# Patient Record
Sex: FEMALE | Race: WHITE | URBAN - METROPOLITAN AREA
[De-identification: names, ages, dates, MRNs, and addresses within clinical notes are randomized per-mention and may not be internally consistent; named-entity substitution may affect disease eponyms.]

---

## 2017-10-25 ENCOUNTER — OUTPATIENT (OUTPATIENT)
Dept: OUTPATIENT SERVICES | Facility: HOSPITAL | Age: 67
LOS: 1 days | Discharge: HOME | End: 2017-10-25

## 2017-10-25 DIAGNOSIS — Z12.31 ENCOUNTER FOR SCREENING MAMMOGRAM FOR MALIGNANT NEOPLASM OF BREAST: ICD-10-CM

## 2018-10-26 ENCOUNTER — OUTPATIENT (OUTPATIENT)
Dept: OUTPATIENT SERVICES | Facility: HOSPITAL | Age: 68
LOS: 1 days | Discharge: HOME | End: 2018-10-26

## 2018-10-26 DIAGNOSIS — Z12.31 ENCOUNTER FOR SCREENING MAMMOGRAM FOR MALIGNANT NEOPLASM OF BREAST: ICD-10-CM

## 2019-11-19 ENCOUNTER — OUTPATIENT (OUTPATIENT)
Dept: OUTPATIENT SERVICES | Facility: HOSPITAL | Age: 69
LOS: 1 days | Discharge: HOME | End: 2019-11-19
Payer: MEDICARE

## 2019-11-19 DIAGNOSIS — Z12.31 ENCOUNTER FOR SCREENING MAMMOGRAM FOR MALIGNANT NEOPLASM OF BREAST: ICD-10-CM

## 2019-11-19 PROCEDURE — 77067 SCR MAMMO BI INCL CAD: CPT | Mod: 26

## 2019-11-19 PROCEDURE — 77063 BREAST TOMOSYNTHESIS BI: CPT | Mod: 26

## 2020-11-20 ENCOUNTER — OUTPATIENT (OUTPATIENT)
Dept: OUTPATIENT SERVICES | Facility: HOSPITAL | Age: 70
LOS: 1 days | Discharge: HOME | End: 2020-11-20
Payer: MEDICARE

## 2020-11-20 DIAGNOSIS — Z12.31 ENCOUNTER FOR SCREENING MAMMOGRAM FOR MALIGNANT NEOPLASM OF BREAST: ICD-10-CM

## 2020-11-20 PROCEDURE — 77063 BREAST TOMOSYNTHESIS BI: CPT | Mod: 26

## 2020-11-20 PROCEDURE — 77067 SCR MAMMO BI INCL CAD: CPT | Mod: 26

## 2021-03-13 ENCOUNTER — INPATIENT (INPATIENT)
Facility: HOSPITAL | Age: 71
LOS: 0 days | Discharge: HOME | End: 2021-03-14
Attending: SURGERY | Admitting: SURGERY
Payer: MEDICARE

## 2021-03-13 ENCOUNTER — EMERGENCY (EMERGENCY)
Facility: HOSPITAL | Age: 71
LOS: 0 days | Discharge: HOME | End: 2021-03-13
Admitting: SURGERY

## 2021-03-13 VITALS
HEIGHT: 64 IN | RESPIRATION RATE: 18 BRPM | DIASTOLIC BLOOD PRESSURE: 86 MMHG | TEMPERATURE: 98 F | OXYGEN SATURATION: 97 % | SYSTOLIC BLOOD PRESSURE: 167 MMHG | WEIGHT: 244.93 LBS | HEART RATE: 91 BPM
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
## 2021-03-13 DIAGNOSIS — S06.5X9A TRAUMATIC SUBDURAL HEMORRHAGE WITH LOSS OF CONSCIOUSNESS OF UNSPECIFIED DURATION, INITIAL ENCOUNTER: ICD-10-CM
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
## 2021-03-13 DIAGNOSIS — S06.5X0A TRAUMATIC SUBDURAL HEMORRHAGE WITHOUT LOSS OF CONSCIOUSNESS, INITIAL ENCOUNTER: ICD-10-CM

## 2021-03-13 DIAGNOSIS — S60.519A ABRASION OF UNSPECIFIED HAND, INITIAL ENCOUNTER: ICD-10-CM
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
## 2021-03-13 DIAGNOSIS — T14.90XA INJURY, UNSPECIFIED, INITIAL ENCOUNTER: ICD-10-CM

## 2021-03-13 DIAGNOSIS — E78.00 PURE HYPERCHOLESTEROLEMIA, UNSPECIFIED: ICD-10-CM

## 2021-03-13 DIAGNOSIS — E66.01 MORBID (SEVERE) OBESITY DUE TO EXCESS CALORIES: ICD-10-CM
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
## 2021-03-13 DIAGNOSIS — S13.9XXA SPRAIN OF JOINTS AND LIGAMENTS OF UNSPECIFIED PARTS OF NECK, INITIAL ENCOUNTER: ICD-10-CM

## 2021-03-13 DIAGNOSIS — S60.511A ABRASION OF RIGHT HAND, INITIAL ENCOUNTER: ICD-10-CM

## 2021-03-13 DIAGNOSIS — M19.041 PRIMARY OSTEOARTHRITIS, RIGHT HAND: ICD-10-CM

## 2021-03-13 DIAGNOSIS — I10 ESSENTIAL (PRIMARY) HYPERTENSION: ICD-10-CM

## 2021-03-13 DIAGNOSIS — Z96.651 PRESENCE OF RIGHT ARTIFICIAL KNEE JOINT: ICD-10-CM

## 2021-03-13 DIAGNOSIS — Z71.3 DIETARY COUNSELING AND SURVEILLANCE: ICD-10-CM

## 2021-03-13 LAB
ALBUMIN SERPL ELPH-MCNC: 4.9 G/DL — SIGNIFICANT CHANGE UP (ref 3.5–5.2)
ALP SERPL-CCNC: 78 U/L — SIGNIFICANT CHANGE UP (ref 30–115)
ALT FLD-CCNC: 22 U/L — SIGNIFICANT CHANGE UP (ref 0–41)
ANION GAP SERPL CALC-SCNC: 10 MMOL/L — SIGNIFICANT CHANGE UP (ref 7–14)
ANION GAP SERPL CALC-SCNC: 11 MMOL/L — SIGNIFICANT CHANGE UP (ref 7–14)
APTT BLD: 31.9 SEC — SIGNIFICANT CHANGE UP (ref 27–39.2)
AST SERPL-CCNC: 32 U/L — SIGNIFICANT CHANGE UP (ref 0–41)
BASOPHILS # BLD AUTO: 0.06 K/UL — SIGNIFICANT CHANGE UP (ref 0–0.2)
BASOPHILS # BLD AUTO: 0.07 K/UL — SIGNIFICANT CHANGE UP (ref 0–0.2)
BASOPHILS NFR BLD AUTO: 0.6 % — SIGNIFICANT CHANGE UP (ref 0–1)
BASOPHILS NFR BLD AUTO: 0.7 % — SIGNIFICANT CHANGE UP (ref 0–1)
BILIRUB SERPL-MCNC: 0.5 MG/DL — SIGNIFICANT CHANGE UP (ref 0.2–1.2)
BLD GP AB SCN SERPL QL: SIGNIFICANT CHANGE UP
BUN SERPL-MCNC: 16 MG/DL — SIGNIFICANT CHANGE UP (ref 10–20)
BUN SERPL-MCNC: 19 MG/DL — SIGNIFICANT CHANGE UP (ref 10–20)
CALCIUM SERPL-MCNC: 10.2 MG/DL — HIGH (ref 8.5–10.1)
CALCIUM SERPL-MCNC: 10.7 MG/DL — HIGH (ref 8.5–10.1)
CHLORIDE SERPL-SCNC: 100 MMOL/L — SIGNIFICANT CHANGE UP (ref 98–110)
CHLORIDE SERPL-SCNC: 102 MMOL/L — SIGNIFICANT CHANGE UP (ref 98–110)
CO2 SERPL-SCNC: 26 MMOL/L — SIGNIFICANT CHANGE UP (ref 17–32)
CO2 SERPL-SCNC: 27 MMOL/L — SIGNIFICANT CHANGE UP (ref 17–32)
CREAT SERPL-MCNC: 0.6 MG/DL — LOW (ref 0.7–1.5)
CREAT SERPL-MCNC: 0.7 MG/DL — SIGNIFICANT CHANGE UP (ref 0.7–1.5)
EOSINOPHIL # BLD AUTO: 0.24 K/UL — SIGNIFICANT CHANGE UP (ref 0–0.7)
EOSINOPHIL # BLD AUTO: 0.26 K/UL — SIGNIFICANT CHANGE UP (ref 0–0.7)
EOSINOPHIL NFR BLD AUTO: 2.5 % — SIGNIFICANT CHANGE UP (ref 0–8)
EOSINOPHIL NFR BLD AUTO: 2.6 % — SIGNIFICANT CHANGE UP (ref 0–8)
GLUCOSE SERPL-MCNC: 101 MG/DL — HIGH (ref 70–99)
GLUCOSE SERPL-MCNC: 113 MG/DL — HIGH (ref 70–99)
HCT VFR BLD CALC: 39.2 % — SIGNIFICANT CHANGE UP (ref 37–47)
HCT VFR BLD CALC: 44 % — SIGNIFICANT CHANGE UP (ref 37–47)
HGB BLD-MCNC: 12.5 G/DL — SIGNIFICANT CHANGE UP (ref 12–16)
HGB BLD-MCNC: 14.1 G/DL — SIGNIFICANT CHANGE UP (ref 12–16)
IMM GRANULOCYTES NFR BLD AUTO: 0.4 % — HIGH (ref 0.1–0.3)
IMM GRANULOCYTES NFR BLD AUTO: 0.4 % — HIGH (ref 0.1–0.3)
INR BLD: 0.98 RATIO — SIGNIFICANT CHANGE UP (ref 0.65–1.3)
LYMPHOCYTES # BLD AUTO: 1.12 K/UL — LOW (ref 1.2–3.4)
LYMPHOCYTES # BLD AUTO: 11.7 % — LOW (ref 20.5–51.1)
LYMPHOCYTES # BLD AUTO: 19.7 % — LOW (ref 20.5–51.1)
LYMPHOCYTES # BLD AUTO: 2.01 K/UL — SIGNIFICANT CHANGE UP (ref 1.2–3.4)
MAGNESIUM SERPL-MCNC: 1.9 MG/DL — SIGNIFICANT CHANGE UP (ref 1.8–2.4)
MCHC RBC-ENTMCNC: 30 PG — SIGNIFICANT CHANGE UP (ref 27–31)
MCHC RBC-ENTMCNC: 30.3 PG — SIGNIFICANT CHANGE UP (ref 27–31)
MCHC RBC-ENTMCNC: 31.9 G/DL — LOW (ref 32–37)
MCHC RBC-ENTMCNC: 32 G/DL — SIGNIFICANT CHANGE UP (ref 32–37)
MCV RBC AUTO: 94.2 FL — SIGNIFICANT CHANGE UP (ref 81–99)
MCV RBC AUTO: 94.4 FL — SIGNIFICANT CHANGE UP (ref 81–99)
MONOCYTES # BLD AUTO: 0.73 K/UL — HIGH (ref 0.1–0.6)
MONOCYTES # BLD AUTO: 0.99 K/UL — HIGH (ref 0.1–0.6)
MONOCYTES NFR BLD AUTO: 7.6 % — SIGNIFICANT CHANGE UP (ref 1.7–9.3)
MONOCYTES NFR BLD AUTO: 9.7 % — HIGH (ref 1.7–9.3)
NEUTROPHILS # BLD AUTO: 6.82 K/UL — HIGH (ref 1.4–6.5)
NEUTROPHILS # BLD AUTO: 7.37 K/UL — HIGH (ref 1.4–6.5)
NEUTROPHILS NFR BLD AUTO: 67 % — SIGNIFICANT CHANGE UP (ref 42.2–75.2)
NEUTROPHILS NFR BLD AUTO: 77.1 % — HIGH (ref 42.2–75.2)
NRBC # BLD: 0 /100 WBCS — SIGNIFICANT CHANGE UP (ref 0–0)
NRBC # BLD: 0 /100 WBCS — SIGNIFICANT CHANGE UP (ref 0–0)
PHOSPHATE SERPL-MCNC: 3.1 MG/DL — SIGNIFICANT CHANGE UP (ref 2.1–4.9)
PLATELET # BLD AUTO: 246 K/UL — SIGNIFICANT CHANGE UP (ref 130–400)
PLATELET # BLD AUTO: 295 K/UL — SIGNIFICANT CHANGE UP (ref 130–400)
POTASSIUM SERPL-MCNC: 4 MMOL/L — SIGNIFICANT CHANGE UP (ref 3.5–5)
POTASSIUM SERPL-MCNC: 4 MMOL/L — SIGNIFICANT CHANGE UP (ref 3.5–5)
POTASSIUM SERPL-SCNC: 4 MMOL/L — SIGNIFICANT CHANGE UP (ref 3.5–5)
POTASSIUM SERPL-SCNC: 4 MMOL/L — SIGNIFICANT CHANGE UP (ref 3.5–5)
PROT SERPL-MCNC: 7.6 G/DL — SIGNIFICANT CHANGE UP (ref 6–8)
PROTHROM AB SERPL-ACNC: 11.3 SEC — SIGNIFICANT CHANGE UP (ref 9.95–12.87)
RAPID RVP RESULT: SIGNIFICANT CHANGE UP
RBC # BLD: 4.16 M/UL — LOW (ref 4.2–5.4)
RBC # BLD: 4.66 M/UL — SIGNIFICANT CHANGE UP (ref 4.2–5.4)
RBC # FLD: 13.2 % — SIGNIFICANT CHANGE UP (ref 11.5–14.5)
RBC # FLD: 13.3 % — SIGNIFICANT CHANGE UP (ref 11.5–14.5)
SARS-COV-2 RNA SPEC QL NAA+PROBE: SIGNIFICANT CHANGE UP
SODIUM SERPL-SCNC: 137 MMOL/L — SIGNIFICANT CHANGE UP (ref 135–146)
SODIUM SERPL-SCNC: 139 MMOL/L — SIGNIFICANT CHANGE UP (ref 135–146)
WBC # BLD: 10.18 K/UL — SIGNIFICANT CHANGE UP (ref 4.8–10.8)
WBC # BLD: 9.57 K/UL — SIGNIFICANT CHANGE UP (ref 4.8–10.8)
WBC # FLD AUTO: 10.18 K/UL — SIGNIFICANT CHANGE UP (ref 4.8–10.8)
WBC # FLD AUTO: 9.57 K/UL — SIGNIFICANT CHANGE UP (ref 4.8–10.8)

## 2021-03-13 PROCEDURE — 70450 CT HEAD/BRAIN W/O DYE: CPT | Mod: 26

## 2021-03-13 PROCEDURE — 73120 X-RAY EXAM OF HAND: CPT | Mod: 26,RT

## 2021-03-13 PROCEDURE — 99284 EMERGENCY DEPT VISIT MOD MDM: CPT

## 2021-03-13 PROCEDURE — 99291 CRITICAL CARE FIRST HOUR: CPT

## 2021-03-13 RX ORDER — CHLORHEXIDINE GLUCONATE 213 G/1000ML
1 SOLUTION TOPICAL DAILY
Refills: 0 | Status: DISCONTINUED | OUTPATIENT
Start: 2021-03-13 | End: 2021-03-14

## 2021-03-13 RX ORDER — HEPARIN SODIUM 5000 [USP'U]/ML
5000 INJECTION INTRAVENOUS; SUBCUTANEOUS EVERY 8 HOURS
Refills: 0 | Status: DISCONTINUED | OUTPATIENT
Start: 2021-03-13 | End: 2021-03-14

## 2021-03-13 RX ORDER — LEVETIRACETAM 250 MG/1
1000 TABLET, FILM COATED ORAL ONCE
Refills: 0 | Status: COMPLETED | OUTPATIENT
Start: 2021-03-13 | End: 2021-03-13

## 2021-03-13 RX ORDER — ACETAMINOPHEN 500 MG
650 TABLET ORAL EVERY 6 HOURS
Refills: 0 | Status: DISCONTINUED | OUTPATIENT
Start: 2021-03-13 | End: 2021-03-14

## 2021-03-13 RX ORDER — PANTOPRAZOLE SODIUM 20 MG/1
40 TABLET, DELAYED RELEASE ORAL
Refills: 0 | Status: DISCONTINUED | OUTPATIENT
Start: 2021-03-13 | End: 2021-03-14

## 2021-03-13 RX ORDER — LOSARTAN POTASSIUM 100 MG/1
10 TABLET, FILM COATED ORAL DAILY
Refills: 0 | Status: DISCONTINUED | OUTPATIENT
Start: 2021-03-13 | End: 2021-03-14

## 2021-03-13 RX ORDER — METOPROLOL TARTRATE 50 MG
75 TABLET ORAL DAILY
Refills: 0 | Status: DISCONTINUED | OUTPATIENT
Start: 2021-03-13 | End: 2021-03-14

## 2021-03-13 RX ORDER — SODIUM CHLORIDE 9 MG/ML
1000 INJECTION, SOLUTION INTRAVENOUS
Refills: 0 | Status: DISCONTINUED | OUTPATIENT
Start: 2021-03-13 | End: 2021-03-14

## 2021-03-13 RX ORDER — TETANUS TOXOID, REDUCED DIPHTHERIA TOXOID AND ACELLULAR PERTUSSIS VACCINE, ADSORBED 5; 2.5; 8; 8; 2.5 [IU]/.5ML; [IU]/.5ML; UG/.5ML; UG/.5ML; UG/.5ML
0.5 SUSPENSION INTRAMUSCULAR ONCE
Refills: 0 | Status: COMPLETED | OUTPATIENT
Start: 2021-03-13 | End: 2021-03-13

## 2021-03-13 RX ORDER — ATORVASTATIN CALCIUM 80 MG/1
10 TABLET, FILM COATED ORAL AT BEDTIME
Refills: 0 | Status: DISCONTINUED | OUTPATIENT
Start: 2021-03-13 | End: 2021-03-14

## 2021-03-13 RX ORDER — HYDROCHLOROTHIAZIDE 25 MG
12.5 TABLET ORAL DAILY
Refills: 0 | Status: DISCONTINUED | OUTPATIENT
Start: 2021-03-13 | End: 2021-03-14

## 2021-03-13 RX ORDER — LEVETIRACETAM 250 MG/1
500 TABLET, FILM COATED ORAL
Refills: 0 | Status: DISCONTINUED | OUTPATIENT
Start: 2021-03-13 | End: 2021-03-14

## 2021-03-13 RX ADMIN — Medication 650 MILLIGRAM(S): at 23:50

## 2021-03-13 RX ADMIN — LEVETIRACETAM 400 MILLIGRAM(S): 250 TABLET, FILM COATED ORAL at 12:19

## 2021-03-13 RX ADMIN — TETANUS TOXOID, REDUCED DIPHTHERIA TOXOID AND ACELLULAR PERTUSSIS VACCINE, ADSORBED 0.5 MILLILITER(S): 5; 2.5; 8; 8; 2.5 SUSPENSION INTRAMUSCULAR at 11:07

## 2021-03-13 RX ADMIN — LEVETIRACETAM 500 MILLIGRAM(S): 250 TABLET, FILM COATED ORAL at 19:18

## 2021-03-13 NOTE — ED PROVIDER NOTE - OBJECTIVE STATEMENT
72 yo F hx of HTN, high cholesterol, arthritis c/o head injury. Patient tripped and fell while walking while walking her dog and hit her head. No LOC, headache, dizzy, visual changes, n/v, or change in mental status. +cut to forehead. + abrasions to right hand. Last tetanus unknown. No other injuries.

## 2021-03-13 NOTE — H&P ADULT - NSHPPHYSICALEXAM_GEN_ALL_CORE
ICU Vital Signs Last 24 Hrs  T(C): 36.8 (13 Mar 2021 13:28), Max: 36.8 (13 Mar 2021 10:40)  T(F): 98.2 (13 Mar 2021 13:28), Max: 98.3 (13 Mar 2021 10:40)  HR: 75 (13 Mar 2021 13:28) (71 - 91)  BP: 122/58 (13 Mar 2021 13:28) (122/58 - 167/86)  RR: 20 (13 Mar 2021 13:28) (18 - 20)  SpO2: 97% (13 Mar 2021 13:28) (97% - 98%)    PHYSICAL EXAM:  General: NAD  HEENT: Normocephalic, atraumatic, EOMI, PEERLA. 2mm laceration to forehead s/p dermabond.  Neck: Soft, midline trachea, no C-spine tenderness.  Chest: No chest wall tenderness or subq  emphysema.  Cardiac: S1, S2, RRR.  Respiratory: Bilateral breath sounds, clear and equal bilaterally.  Abdomen: Soft, non-distended, non-tender, no rebound.  Groin: Normal appearing, pelvis stable   Ext: palp radial b/l UE, b/l DP palp in Lower Extrem. Small abrasions to palmar aspect or right hand without active bleeding.  Back: no TTP, no palpable runoff/stepoff/deformity.

## 2021-03-13 NOTE — ED ADULT NURSE REASSESSMENT NOTE - NS ED NURSE REASSESS COMMENT FT1
Received pt from [facility]. Ecchymosis and swelling to forehead. Pt denies pain or concerns at this time, plan of care reviewed with pt, she verbalized comprehension. Vitals are stable.

## 2021-03-13 NOTE — ED ADULT NURSE REASSESSMENT NOTE - NS ED NURSE REASSESS COMMENT FT1
Received pt from previous RN, pt aox4 in no distress. denies dizziness/ headache/ weakness.  no neurological deficit on assessment.  pt made comfortable, call bell within reach, verbalized understanding on how to use call bell .  Safety precautions in place; will continue to monitor /assess. Received pt from previous RN, pt aox4 in no distress. denies dizziness/ headache/ weakness.  no neurological deficit on assessment.  pt made comfortable, call bell within reach, verbalized understanding on how to use call bell .  pending rpt ct scan. Safety precautions in place; will continue to monitor /assess.

## 2021-03-13 NOTE — ED PROVIDER NOTE - PHYSICAL EXAMINATION
Gen: Alert, NAD, well appearing  Head: NC, +3mm lac to forehead with surrounding swelling, PERRL, EOMI, normal lids/conjunctiva  ENT: normal hearing, patent oropharynx without erythema/exudate  Neck: +supple, no tenderness/meningismus,  Mskel: no edema/erythema/cyanosis. FROM x4  Skin: no rash, warm/dry. + laceration to forehead. + abrasions to right hand  Neuro: AAOx3, no sensory/motor deficits

## 2021-03-13 NOTE — ED PROVIDER NOTE - NS ED ROS FT
Review of Systems    Constitutional: (-) fever, (-) chills  Eyes/ENT: (-) blurry vision, (-) epistaxis, (-) sore throat  Cardiovascular: (-) chest pain, (-) syncope  Respiratory: (-) cough, (-) shortness of breath  Gastrointestinal: (-) pain, (-) nausea, (-) vomiting, (-) diarrhea  Musculoskeletal: (-) neck pain, (-) back pain, (-) body aches  Integumentary: (-) rash, (-) edema, (+) forehead laceration, (+) right hand abrasion  Neurological: (-) headache, (-) altered mental status

## 2021-03-13 NOTE — ED PROVIDER NOTE - ATTENDING CONTRIBUTION TO CARE
pt on asa with forehead lac s/p mechanical trip and fall no LOC, PE s above GCS 15, imaging reviewed, Dr Triston dudley trauma attending aware

## 2021-03-13 NOTE — H&P ADULT - ASSESSMENT
Patient is a 71F with PMH listed below who is a trauma transfer from [facility] s/p mechanical trip and fall over raised piece of pavement while walking her dog, +HT, -LOC, -AC, found to have a SDH.    PLAN:  -Admit to trauma surgery  -Repeat CTH at 7PM or sooner if AMS  -Keppra for seizure prophylaxis  -Hold ASA  -Neuro checks q4h  -F/u right hand x-ray

## 2021-03-13 NOTE — CONSULT NOTE ADULT - SUBJECTIVE AND OBJECTIVE BOX
HPI:  70 yo F hx of HTN, high cholesterol, arthritis c/o head injury. Patient tripped and fell while walking while walking her dog and hit her head. No LOC, headache, dizzy, visual changes, n/v, or change in mental status. +cut to forehead. + abrasions to right hand. Last tetanus unknown. No other injuries.    CT head done:  < from: CT Head No Cont (03.13.21 @ 11:31) >  IMPRESSION:  Focal subdural hemorrhage noted along the left anterior interhemispheric fissure with associated right frontal scalp soft tissue hematoma. No evidence of calvarial fracture.  < end of copied text >  Called to see patient for SDH.  Pt seen and examined at bedside in  [place].  Pt in stretcher, awake, alert.  Pt is AAOX3, able to follow commands.  Sts she fell walking her dog, fell onto her hands and face.  Denies LOC.  Denies headaches, dizziness or vision trouble.  Pt admits to taking ASA 325mg 2 tabs daily for arthritis.  Denies anticoagulation use.           PAST MEDICAL & SURGICAL HISTORY:  Essential hypertension    High blood cholesterol        Imaging:    Home Medications:  CeleBREX 200 mg oral capsule: orally once a day (13 Mar 2021 10:44)  Excedrin oral tablet:  (13 Mar 2021 10:44)  Hyzaar: orally once a day (13 Mar 2021 10:44)  Livalo 2 mg oral tablet:  (13 Mar 2021 10:45)  metoprolol tartrate 75 mg oral tablet: orally once a day (13 Mar 2021 10:44)      Allergies    No Known Allergies    Intolerances        ROS:  [x  ] A ten-point review of systems is negative except as noted   [  ] Due to altered mental status/intubation, subjective information were not able to be obtained from the patient. History was obtained, to the extent possible, from review of the chart and collateral sources of information    MEDICATIONS  (STANDING):    MEDICATIONS  (PRN):      ICU Vital Signs Last 24 Hrs  T(C): 36.8 (13 Mar 2021 13:28), Max: 36.8 (13 Mar 2021 10:40)  T(F): 98.2 (13 Mar 2021 13:28), Max: 98.3 (13 Mar 2021 10:40)  HR: 75 (13 Mar 2021 13:28) (71 - 91)  BP: 122/58 (13 Mar 2021 13:28) (122/58 - 167/86)  BP(mean): --  ABP: --  ABP(mean): --  RR: 20 (13 Mar 2021 13:28) (18 - 20)  SpO2: 97% (13 Mar 2021 13:28) (97% - 98%)      I&O's Detail      CBC Full  -  ( 13 Mar 2021 12:00 )  WBC Count : 9.57 K/uL  RBC Count : 4.66 M/uL  Hemoglobin : 14.1 g/dL  Hematocrit : 44.0 %  Platelet Count - Automated : 295 K/uL  Mean Cell Volume : 94.4 fL  Mean Cell Hemoglobin : 30.3 pg  Mean Cell Hemoglobin Concentration : 32.0 g/dL  Auto Neutrophil # : 7.37 K/uL  Auto Lymphocyte # : 1.12 K/uL  Auto Monocyte # : 0.73 K/uL  Auto Eosinophil # : 0.24 K/uL  Auto Basophil # : 0.07 K/uL  Auto Neutrophil % : 77.1 %  Auto Lymphocyte % : 11.7 %  Auto Monocyte % : 7.6 %  Auto Eosinophil % : 2.5 %  Auto Basophil % : 0.7 %    03-13    137  |  100  |  19  ----------------------------<  113<H>  4.0   |  27  |  0.7    Ca    10.7<H>      13 Mar 2021 12:00    TPro  7.6  /  Alb  4.9  /  TBili  0.5  /  DBili  x   /  AST  32  /  ALT  22  /  AlkPhos  78  03-13            AAOX3. Verbal function intact  speech clear  follows commands  laceration to forehead closed w/ glue  tongue midline  facial motions symmetric  PERRL  tracking  no drift  Finger to Nose intact  Motor: MAEx4  5/5 power in b/l UE and LE  sensation intact        Assessment/Plan:   No acute neurosurgical intervention  Hold ASA for now  repeat CT head in 6 hrs or sooner if AMS  neuro checks q 4 hrs  Keppra for seizure ppx  admit to regular floor  above discussed w attg

## 2021-03-13 NOTE — ED ADULT TRIAGE NOTE - CHIEF COMPLAINT QUOTE
pt states she tripped and fell while walking the dog, fell forward and hit the front portion of head, NO LOC, NO nausea or vomiting, complains of headache that began immediately fall 20 MIN PTA

## 2021-03-13 NOTE — ED ADULT NURSE NOTE - NSIMPLEMENTINTERV_GEN_ALL_ED
patient
Implemented All Fall Risk Interventions:  [place] to call system. Call bell, personal items and telephone within reach. Instruct patient to call for assistance. Room bathroom lighting operational. Non-slip footwear when patient is off stretcher. Physically safe environment: no spills, clutter or unnecessary equipment. Stretcher in lowest position, wheels locked, appropriate side rails in place. Provide visual cue, wrist band, yellow gown, etc. Monitor gait and stability. Monitor for mental status changes and reorient to person, place, and time. Review medications for side effects contributing to fall risk. Reinforce activity limits and safety measures with patient and family.

## 2021-03-13 NOTE — H&P ADULT - HISTORY OF PRESENT ILLNESS
Patient is a 71F with PMH listed below who is a trauma transfer from [facility] s/p mechanical trip and fall over raised piece of pavement while walking her dog, +HT, -LOC, -AC. States that she landed on her bilateral hands and also hit her forehead. Patient reported initial headache which has now dulled and rates it as a 1/10. Denies dizziness, changes in vision, numbness or tingling in the extremities. States that she was able to ambulate after the fall without difficulty. Patient transferred to [place] due to CTH findings of an acute SDH without overlying skull fracture. Denies pain elsewhere or other symptoms at this time.

## 2021-03-13 NOTE — H&P ADULT - NSHPLABSRESULTS_GEN_ALL_CORE
LABS:             14.1   9.57  )-----------( 295      ( 13 Mar 2021 12:00 )             44.0     03-13  137  |  100  |  19  ----------------------------<  113<H>  4.0   |  27  |  0.7    Ca    10.7<H>      13 Mar 2021 12:00    TPro  7.6  /  Alb  4.9  /  TBili  0.5  /  DBili  x   /  AST  32  /  ALT  22  /  AlkPhos  78  03-13    PT/INR - ( 13 Mar 2021 12:00 )   PT: 11.30 sec;   INR: 0.98 ratio    PTT - ( 13 Mar 2021 12:00 )  PTT:31.9 sec    RADIOLOGY:  < from: CT Head No Cont (03.13.21 @ 11:31) >    IMPRESSION:  Focal subdural hemorrhage noted along the left anterior interhemispheric fissure with associated right frontal scalp soft tissue hematoma. No evidence of calvarial fracture.    < end of copied text >

## 2021-03-13 NOTE — CONSULT NOTE ADULT - ATTENDING COMMENTS
as above pt seen and examined. CTH stable. Neurointact. No neurosurgical intervention. Pt told to hold ASA containing products for 7 days (takes for pain0. F/u as outpt in 2 weeks. D/c per trauma team. Please reconsult as needed
pt examined 3/13  s/p fall  small subdural  hematoma  bruise on the forehead.  continue plan as per neurosurgery  admit observe

## 2021-03-13 NOTE — CONSULT NOTE ADULT - SUBJECTIVE AND OBJECTIVE BOX
JAYLON CEJA [id number]  71y Female    HPI:  Patient is a 71F with PMH listed below who is a trauma transfer from [facility] s/p mechanical trip and fall while walking her dog. +HT, -LOC, -AC. Head CT shows an acute SDH without overlying skull fracture. She denies any other symptoms at this time. GCS is 15 and she is A+Ox3    PAST MEDICAL & SURGICAL HISTORY:  Essential hypertension  High blood cholesterol    MEDICATIONS  (STANDING):    MEDICATIONS  (PRN):    Allergies: No Known Allergies    REVIEW OF SYSTEMS    [x] A ten-point review of systems was otherwise negative except as noted.  [ ] Due to altered mental status/intubation, subjective information were not able to be obtained from the patient. History was obtained, to the extent possible, from review of the chart and collateral sources of information.      Vital Signs Last 24 Hrs  T(C): 36.8 (13 Mar 2021 10:40), Max: 36.8 (13 Mar 2021 10:40)  T(F): 98.3 (13 Mar 2021 10:40), Max: 98.3 (13 Mar 2021 10:40)  HR: 72 (13 Mar 2021 12:29) (72 - 91)  BP: 151/69 (13 Mar 2021 12:29) (151/69 - 167/86)  RR: 20 (13 Mar 2021 12:29) (18 - 20)  SpO2: 98% (13 Mar 2021 12:29) (97% - 98%)    PHYSICAL EXAM:  GENERAL: NAD, well-appearing  HEENT: Overlying scalp laceration s/p repair   CHEST/LUNG: Clear to auscultation bilaterally  HEART: Regular rate and rhythm  ABDOMEN: Soft, Nontender, Nondistended;     LABS:                      14.1   9.57  )-----------( 295      ( 13 Mar 2021 12:00 )             44.0       Auto Neutrophil %: 77.1 % (03-13-21 @ 12:00)  Auto Immature Granulocyte %: 0.4 % (03-13-21 @ 12:00)    03-13    137  |  100  |  19  ----------------------------<  113<H>  4.0   |  27  |  0.7    Calcium, Total Serum: 10.7 mg/dL (03-13-21 @ 12:00)    LFTs:             7.6  | 0.5  | 32       ------------------[78      ( 13 Mar 2021 12:00 )  4.9  | x    | 22          Coags:     11.30  ----< 0.98    ( 13 Mar 2021 12:00 )     31.9        RADIOLOGY & ADDITIONAL STUDIES:  < from: CT Head No Cont (03.13.21 @ 11:31) >  IMPRESSION:  Focal subdural hemorrhage noted along the left anterior interhemispheric fissure with associated right frontal scalp soft tissue hematoma. No evidence of calvarial fracture.    < end of copied text >   TRAUMA ACTIVATION LEVEL:  Trauma transfer from [facility]    MECHANISM OF INJURY:      [X] Blunt  	[] MVC	[X] Fall	[] Pedestrian Struck	[] Motorcycle   [] Assault   [] Bicycle collision  [] Sports injury     [] Penetrating  	[] Gun Shot Wound 		[] Stab Wound    GCS: 15  	E: 4	V: 5	M: 6    HPI:  Patient is a 71F with PMH listed below who is a trauma transfer from [facility] s/p mechanical trip and fall over raised piece of pavement while walking her dog, +HT, -LOC, -AC. States that she landed on her bilateral hands and also hit her forehead. Patient reported initial headache which has now dulled and rates it as a 1/10. Denies dizziness, changes in vision, numbness or tingling in the extremities. States that she was able to ambulate after the fall without difficulty. Patient transferred to [place] due to CTH findings of an acute SDH without overlying skull fracture. Denies pain elsewhere or other symptoms at this time.    PAST MEDICAL & SURGICAL HISTORY:  Essential hypertension  High blood cholesterol    Allergies  No Known Allergies    Home Medications:  CeleBREX 200 mg oral capsule: orally once a day (13 Mar 2021 10:44)  Excedrin oral tablet:  (13 Mar 2021 10:44)  Hyzaar: orally once a day (13 Mar 2021 10:44)  Livalo 2 mg oral tablet:  (13 Mar 2021 10:45)  metoprolol tartrate 75 mg oral tablet: orally once a day (13 Mar 2021 10:44)    ROS: 10-system review is otherwise negative except HPI above.      Primary Survey:    A - airway intact  B - bilateral breath sounds and good chest rise  C - palpable pulses in all extremities  D - GCS 15 on arrival, JOSEPH  Exposure obtained    Vital Signs Last 24 Hrs  T(C): 36.8 (13 Mar 2021 13:28), Max: 36.8 (13 Mar 2021 10:40)  T(F): 98.2 (13 Mar 2021 13:28), Max: 98.3 (13 Mar 2021 10:40)  HR: 75 (13 Mar 2021 13:28) (71 - 91)  BP: 122/58 (13 Mar 2021 13:28) (122/58 - 167/86)  RR: 20 (13 Mar 2021 13:28) (18 - 20)  SpO2: 97% (13 Mar 2021 13:28) (97% - 98%)    Secondary Survey:   General: NAD  HEENT: Normocephalic, atraumatic, EOMI, PEERLA. 2mm laceration to forehead s/p dermabond.  Neck: Soft, midline trachea, no C-spine tenderness.  Chest: No chest wall tenderness or subq  emphysema.  Cardiac: S1, S2, RRR.  Respiratory: Bilateral breath sounds, clear and equal bilaterally.  Abdomen: Soft, non-distended, non-tender, no rebound.  Groin: Normal appearing, pelvis stable   Ext: palp radial b/l UE, b/l DP palp in Lower Extrem. Small abrasions to palmar aspect or right hand without active bleeding.  Back: no TTP, no palpable runoff/stepoff/deformity.    LABS:                    14.1   9.57  )-----------( 295      ( 13 Mar 2021 12:00 )             44.0       Auto Neutrophil %: 77.1 % (03-13-21 @ 12:00)  Auto Immature Granulocyte %: 0.4 % (03-13-21 @ 12:00)    03-13  137  |  100  |  19  ----------------------------<  113<H>  4.0   |  27  |  0.7    Calcium, Total Serum: 10.7 mg/dL (03-13-21 @ 12:00)    LFTs:      7.6  | 0.5  | 32       ------------------[78      ( 13 Mar 2021 12:00 )  4.9  | x    | 22          Coags:  11.30  ----< 0.98    ( 13 Mar 2021 12:00 )     31.9      RADIOLOGY & ADDITIONAL STUDIES:  < from: CT Head No Cont (03.13.21 @ 11:31) >  IMPRESSION:  Focal subdural hemorrhage noted along the left anterior interhemispheric fissure with associated right frontal scalp soft tissue hematoma. No evidence of calvarial fracture.    < end of copied text >

## 2021-03-13 NOTE — ED ADULT NURSE REASSESSMENT NOTE - NS ED NURSE REASSESS COMMENT FT1
Pt to be transferred to [place] ER for evaluation of focal subdural hemorrhage. Verbal report given to ER Charge RAFAELA Ellis. Await arrival of transport.

## 2021-03-13 NOTE — ED PROVIDER NOTE - CHILD ABUSE FACILITY
Include Location In Plan?: Yes Detail Level: Simple Detail [facility]

## 2021-03-13 NOTE — CONSULT NOTE ADULT - ASSESSMENT
71F s/p trip and fall with SDH    Plan:  Neurosurgery consult for SDH recommendations  PXR  NPO  IVF  d/w dr. Logan Patient is a 71F with PMH listed below who is a trauma transfer from [facility] s/p mechanical trip and fall over raised piece of pavement while walking her dog, +HT, -LOC, -AC, found to have a SDH.    PLAN:  -Neurosurgery consult for SDH recommendations  -NPO, IVFs  -Pelvic x-ray  -Right hand/wrist x-ray    D/w Dr. Logan

## 2021-03-13 NOTE — H&P ADULT - ATTENDING COMMENTS
pt examined 3/13  s/p fall  small subdural  hematoma  bruise on the forehead.  continue plan as per neurosurgery  admit observe

## 2021-03-13 NOTE — ED ADULT NURSE NOTE - CHPI ED NUR SYMPTOMS POS
patient states she was walking her dog and tripped and fell forwards and injured the front portion of her head, NO LOC< no nausea or vomiting, complains of pain to her right hand and headache

## 2021-03-13 NOTE — ED PROVIDER NOTE - PROGRESS NOTE DETAILS
Neurosx VIVIAN Nation awar ---> give Keppra 1gm,  platelets to reverse ASA.  VIVIAN Davis/Dr Beatty accepts transfer. Dr. Khan aware Neurosx VIVIAN Nation aware ---> give Keppra 1gm,  platelets to reverse ASA.  VIVIAN Davis/Dr Beatty accepts transfer. Dr. Khan aware Seen by Neurosx - will admit to trauma service, requesting repeat CT in 6 hours.

## 2021-03-14 ENCOUNTER — TRANSCRIPTION ENCOUNTER (OUTPATIENT)
Age: 71
End: 2021-03-14

## 2021-03-14 VITALS
TEMPERATURE: 97 F | SYSTOLIC BLOOD PRESSURE: 128 MMHG | HEART RATE: 63 BPM | RESPIRATION RATE: 18 BRPM | DIASTOLIC BLOOD PRESSURE: 66 MMHG

## 2021-03-14 PROCEDURE — 99239 HOSP IP/OBS DSCHRG MGMT >30: CPT

## 2021-03-14 PROCEDURE — 73560 X-RAY EXAM OF KNEE 1 OR 2: CPT | Mod: 26,RT

## 2021-03-14 PROCEDURE — 72125 CT NECK SPINE W/O DYE: CPT | Mod: 26

## 2021-03-14 PROCEDURE — 73110 X-RAY EXAM OF WRIST: CPT | Mod: 26,RT

## 2021-03-14 PROCEDURE — 70450 CT HEAD/BRAIN W/O DYE: CPT | Mod: 26

## 2021-03-14 PROCEDURE — 99222 1ST HOSP IP/OBS MODERATE 55: CPT

## 2021-03-14 RX ORDER — MAGNESIUM SULFATE 500 MG/ML
2 VIAL (ML) INJECTION ONCE
Refills: 0 | Status: COMPLETED | OUTPATIENT
Start: 2021-03-14 | End: 2021-03-14

## 2021-03-14 RX ORDER — LEVETIRACETAM 250 MG/1
1 TABLET, FILM COATED ORAL
Qty: 14 | Refills: 0
Start: 2021-03-14 | End: 2021-03-20

## 2021-03-14 RX ORDER — ACETAMINOPHEN 500 MG
2 TABLET ORAL
Qty: 0 | Refills: 0 | DISCHARGE
Start: 2021-03-14

## 2021-03-14 RX ADMIN — PANTOPRAZOLE SODIUM 40 MILLIGRAM(S): 20 TABLET, DELAYED RELEASE ORAL at 05:31

## 2021-03-14 RX ADMIN — Medication 650 MILLIGRAM(S): at 11:27

## 2021-03-14 RX ADMIN — Medication 12.5 MILLIGRAM(S): at 05:31

## 2021-03-14 RX ADMIN — Medication 16.67 GRAM(S): at 05:32

## 2021-03-14 RX ADMIN — Medication 75 MILLIGRAM(S): at 05:31

## 2021-03-14 RX ADMIN — LEVETIRACETAM 500 MILLIGRAM(S): 250 TABLET, FILM COATED ORAL at 05:31

## 2021-03-14 RX ADMIN — Medication 650 MILLIGRAM(S): at 17:29

## 2021-03-14 RX ADMIN — LEVETIRACETAM 500 MILLIGRAM(S): 250 TABLET, FILM COATED ORAL at 17:29

## 2021-03-14 RX ADMIN — Medication 650 MILLIGRAM(S): at 05:31

## 2021-03-14 NOTE — PHYSICAL THERAPY INITIAL EVALUATION ADULT - ADDITIONAL COMMENTS
Patient lives with son. Has 6 steps to enter home and a chair lift to second floor. Patient claimed she was independent in ADL's and ambulates without assistive device in home and uses a cane in community.

## 2021-03-14 NOTE — PROGRESS NOTE ADULT - ATTENDING COMMENTS
72 y/o female, S/P Fall.  Small left SDH.  Neck sprain.  Right hand contusion.    PLAN:  - Neurosurgery f/u  - pain control  - OOB  - PT  -  for discharge planning 70 y/o female, S/P Fall.  Small left SDH.  Neck sprain.  Right hand contusion.  Right hand triquetral fracture.    PLAN:  - Neurosurgery f/u  - Ortho recommends outpatient f/u  - pain control  - OOB  - PT  -  for discharge planning

## 2021-03-14 NOTE — DISCHARGE NOTE NURSING/CASE MANAGEMENT/SOCIAL WORK - NSDCFUADDAPPT_GEN_ALL_CORE_FT
You can follow up with [facility], on an as needed basis for the basal joint arthritis  call [phone] to schedule an appointment if needed

## 2021-03-14 NOTE — DISCHARGE NOTE NURSING/CASE MANAGEMENT/SOCIAL WORK - PATIENT PORTAL LINK FT
You can access the FollowMyHealth Patient Portal offered by [facility] by registering at the following website: [email or web address]. By joining [facility]’s FollowMyHealth portal, you will also be able to view your health information using other applications (apps) compatible with our system.

## 2021-03-14 NOTE — CONSULT NOTE ADULT - SUBJECTIVE AND OBJECTIVE BOX
HPI:  Patient is a 71F with PMH listed below who is a trauma transfer from [facility] s/p mechanical trip and fall over raised piece of pavement while walking her dog, +HT, -LOC, -AC. States that she landed on her bilateral hands and also hit her forehead. Patient reported initial headache which has now dulled and rates it as a 1/10. Denies dizziness, changes in vision, numbness or tingling in the extremities. States that she was able to ambulate after the fall without difficulty. Patient transferred to [place] due to CTH findings of an acute SDH without overlying skull fracture. Denies pain elsewhere or other symptoms at this time. (13 Mar 2021 16:31)      PAST MEDICAL & SURGICAL HISTORY:  Essential hypertension    High blood cholesterol        Hospital Course: Follow-up Head CT stable. Seen by PT. Stands and ambulates with supervision    TODAY'S SUBJECTIVE & REVIEW OF SYMPTOMS:       Constitutional WNL   Cardio WNL   Resp WNL   GI WNL  Heme WNL  Endo WNL  Skin WNL  MSK Wearing hard cervical collar. Hx OA, Bilat TKR, decreased left hip ROM.   Neuro WNL. History mild balance impairment  Cognitive WNL  Psych WNL      MEDICATIONS  (STANDING):  acetaminophen   Tablet .. 650 milliGRAM(s) Oral every 6 hours  atorvastatin 10 milliGRAM(s) Oral at bedtime  chlorhexidine 4% Liquid 1 Application(s) Topical daily  hydrochlorothiazide 12.5 milliGRAM(s) Oral daily  lactated ringers. 1000 milliLiter(s) (100 mL/Hr) IV Continuous <Continuous>  levETIRAcetam 500 milliGRAM(s) Oral two times a day  losartan 10 milliGRAM(s) Oral daily  metoprolol tartrate 75 milliGRAM(s) Oral daily  pantoprazole    Tablet 40 milliGRAM(s) Oral before breakfast    MEDICATIONS  (PRN):      FAMILY HISTORY:      Allergies    No Known Allergies    Intolerances        SOCIAL HISTORY:    [  ] Etoh  [  ] Smoking  [  ] Substance abuse     Home Environment:  [  ] Home Alone  [x  ] Lives with Family  [  ] Home Health Aid    Dwelling:  [  ] Apartment  [x  ] Private House  [  ] Adult Home  [  ] Skilled Nursing Facility      [  ] Short Term  [  ] Long Term  [x  ] Stairs - has stair lift      Elevator [  ]    FUNCTIONAL STATUS PTA: (Check all that apply)  Ambulation: [ x  ]Independent   [   ] Requires Assistance   [  ] Dependent     [  ] Non-Ambulatory       Assistive Device: [ x ] SA Cane  [  ]  Q Cane  [  ] Walker  [  ]  Wheelchair  ADL : [ x ] Independent    [   ] Requires Assistance    [  ]  Dependent       Vital Signs Last 24 Hrs  T(C): 35.9 (14 Mar 2021 08:30), Max: 36.8 (13 Mar 2021 13:28)  T(F): 96.6 (14 Mar 2021 08:30), Max: 98.2 (13 Mar 2021 13:28)  HR: 60 (14 Mar 2021 08:30) (60 - 92)  BP: 128/59 (14 Mar 2021 08:30) (114/55 - 151/69)  BP(mean): --  RR: 18 (14 Mar 2021 08:30) (18 - 20)  SpO2: 95% (14 Mar 2021 01:00) (95% - 99%)      PHYSICAL EXAM: Alert & Oriented X3  GENERAL: NAD, well-groomed, well-developed  HEAD:  small echymosis mid-forhead, Normocephalic  EYES: EOMI, PERRL  NECK: in hard collar  HEART: Regular rate and rhythm  ABDOMEN: Soft, Nontender, Nondistended  EXTREMITIES:  No clubbing, cyanosis, or edema. S/p Bilat TKR  ROM:  [ x ] WFL all extremities  [  ] Abnormal    NERVOUS SYSTEM:  Alert and oriented x 3   Cranial Nerves 2-12: [x   ] intact  [  ] Abnormal   Motor Strength: [ x  ] WFL all extremities   [  ] Abnormal   Sensation: [x   ] intact to light touch  [  ] Abnormal   Reflexes: [   ] Symmetric  [  ]  Abnormal     FUNCTIONAL STATUS:  Bed Mobility: [ x  ]Independent  [  ] Supervision  [  ] Needs Assistance   [  ] N/A   Transfers: [   ] Independent  [x  ] Supervision  [  ] Needs Assistance  [  ]  N/A   Ambulation: [   ] Independent  [ x ] Supervision  [  ] Needs Assistance  [  ] N/A   ADL: [   ] Independent  [  ] Requires Assistance  [  ] N/A       LABS:                        12.5   10.18 )-----------( 246      ( 13 Mar 2021 21:30 )             39.2     03-13    139  |  102  |  16  ----------------------------<  101<H>  4.0   |  26  |  0.6<L>    Ca    10.2<H>      13 Mar 2021 21:30  Phos  3.1     03-13  Mg     1.9     03-13    TPro  7.6  /  Alb  4.9  /  TBili  0.5  /  DBili  x   /  AST  32  /  ALT  22  /  AlkPhos  78  03-13    PT/INR - ( 13 Mar 2021 12:00 )   PT: 11.30 sec;   INR: 0.98 ratio         PTT - ( 13 Mar 2021 12:00 )  PTT:31.9 sec      RADIOLOGY & ADDITIONAL STUDIES:    < from: CT Head No Cont (03.14.21 @ 00:24) >  FINDINGS:  Redemonstration of an acute subdural hematoma within the left side of the anterior interhemispheric fissure, measuring approximately 0.9 x 0.3 cm, without significant change.    < end of copied text >          Assesment:

## 2021-03-14 NOTE — CONSULT NOTE ADULT - ASSESSMENT
IMPRESSION: Rehab of head trauma with no LOC, left SDH. Stable. Now obvious new neurological deficits    PRECAUTIONS: [  ] Cardiac  [  ] Respiratory  [  ] Seizures [  ] Contact Isolation  [  ] Droplet Isolation  [  ] Other    Weight Bearing Status: wbat; Cervical collar until cleared by NSGY    RECOMMENDATION:    Out of Bed to Chair     DVT/Decubiti Prophylaxis    REHAB PLAN:     [   ] Bedside P/T 3-5 times a week   [   ]   Bedside O/T  2-3 times a week             [  x ] No further Rehab Therapy Indicated                   [   ]  Speech Therapy   Conditioning/ROM                                    ADL  Bed Mobility                                               Conditioning/ROM  Transfers                                                     Bed Mobility  Sitting /Standing Balance                         Transfers                                        Gait Training                                               Sitting/Standing Balance  Stair Training [   ]Applicable                    Home equipment Eval                                                                        Splinting  [   ] Only      GOALS:   ADL   [x   ]   Independent                    Transfers  [ x  ] Independent                          Ambulation  [ x  ] Independent     [  x  ] With device                            [   ]  CG                                                         [   ]  CG                                                                  [   ] CG                            [    ] Min A                                                   [   ] Min A                                                              [   ] Min  A          DISCHARGE PLAN:   [   ]  Good candidate for Intensive Rehabilitation/Hospital based-[facility]                                             Will tolerate 3hrs Intensive Rehab Daily                                       [    ]  Short Term Rehab in Skilled Nursing Facility                                       [   x ]  Home                                          [    ]  Possible Candidate for Intensive Hospital based Rehab

## 2021-03-14 NOTE — PHYSICAL THERAPY INITIAL EVALUATION ADULT - PLANNED THERAPY INTERVENTIONS, PT EVAL
No PT intervention needed. Patient independent in ambulation using straight cane, supervision on stairs for safety. Contact PT when status changes.

## 2021-03-14 NOTE — DISCHARGE NOTE PROVIDER - NSDCMRMEDTOKEN_GEN_ALL_CORE_FT
CeleBREX 200 mg oral capsule: orally once a day  Excedrin oral tablet:   Hyzaar: orally once a day  Livalo 2 mg oral tablet:   metoprolol tartrate 75 mg oral tablet: orally once a day   acetaminophen 325 mg oral tablet: 2 tab(s) orally every 6 hours as needed for pain  CeleBREX 200 mg oral capsule: orally once a day  Excedrin oral tablet:   Hyzaar: orally once a day  levETIRAcetam 500 mg oral tablet: 1 tab(s) orally 2 times a day for 7 days  Livalo 2 mg oral tablet:   metoprolol tartrate 75 mg oral tablet: orally once a day

## 2021-03-14 NOTE — DISCHARGE NOTE PROVIDER - NSDCFUADDINST_GEN_ALL_CORE_FT
You are being discharged from the hospital after being admitted due to a mechanical fall  Continue your regular diet.  Pain: Take tylenol/ibuprofen as needed every 6 hours for pain. You are also being discharge with keppra, please take it as prescribed for 1 week. Please hold aspirin for 1 week as per recommendations of neurosurgery. Your medication have been sent to your pharmacy. Please continue your own home medications as previously prescribed. Contact your primary care provider with any questions.   Activity: Start walking as soon as possible to increase circulation and prevent blood clots.   Follow up: Please plan to follow up  with Dr. Engel in 2 weeks and in the Trauma Clinic in 2 weeks. Contact the office to confirm appointment. The phone number and address are available within discharge paperwork.  Please call the office or return to Emergency Department if you experience intense headaches, nauseas/vomiting, somnolence

## 2021-03-14 NOTE — CONSULT NOTE ADULT - SUBJECTIVE AND OBJECTIVE BOX
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 650 milliGRAM(s) Oral every 6 hours  atorvastatin 10 milliGRAM(s) Oral at bedtime  chlorhexidine 4% Liquid 1 Application(s) Topical daily  hydrochlorothiazide 12.5 milliGRAM(s) Oral daily  levETIRAcetam 500 milliGRAM(s) Oral two times a day  losartan 10 milliGRAM(s) Oral daily  metoprolol tartrate 75 milliGRAM(s) Oral daily  pantoprazole    Tablet 40 milliGRAM(s) Oral before breakfast      Allergies  No Known Allergies        T(C): 36.2 (03-14-21 @ 13:07), Max: 36.8 (03-13-21 @ 19:58)  HR: 67 (03-14-21 @ 13:07) (60 - 92)  BP: 130/64 (03-14-21 @ 13:07) (114/55 - 143/78)  RR: 18 (03-14-21 @ 13:07) (18 - 18)  SpO2: 95% (03-14-21 @ 01:00) (95% - 99%)    Physical Exam  NAD  Breathing comfortably on RA  Resting comfortably  RUE  Small superficial abrasions on the fingers, no extension beyond epidermis  NTTP over dorsal wrist, NTTP over triquetrum  + CMC grind test  No swelling/erythema/ecchymosis noted  Motor: AIN/PIN/Ulnar intact  Sensory: Ax/M/R/U intact  Vasc: hand WWP, 2+ radial pulse    Labs                        12.5   10.18 )-----------( 246      ( 13 Mar 2021 21:30 )             39.2     03-13    139  |  102  |  16  ----------------------------<  101<H>  4.0   |  26  |  0.6<L>    Ca    10.2<H>      13 Mar 2021 21:30  Phos  3.1     03-13  Mg     1.9     03-13    TPro  7.6  /  Alb  4.9  /  TBili  0.5  /  DBili  x   /  AST  32  /  ALT  22  /  AlkPhos  78  03-13    LIVER FUNCTIONS - ( 13 Mar 2021 12:00 )  Alb: 4.9 g/dL / Pro: 7.6 g/dL / ALK PHOS: 78 U/L / ALT: 22 U/L / AST: 32 U/L / GGT: x           PT/INR - ( 13 Mar 2021 12:00 )   PT: 11.30 sec;   INR: 0.98 ratio         PTT - ( 13 Mar 2021 12:00 )  PTT:31.9 sec    Img  R hand and wrist XR  Severe basal joint arthritis  No fracture of the carpal bones  Calcification in the dorsal wrist may represent old fracture or calcified joint capsule  No dislocations    A/P: Patient is a 71y year old Female with right hand superficial abrasions, thumb basal joint arthritis, no pain in the wrist to suggest triquetral avulsion fracture    No immobilization indicated  WBAT on RUE  Patient counseled on basal joint arthritis and the surgical treatments available for this condition  At this point she is not bothered by this joint and does not want to consider surgery  Continue activities as tolerated  She may follow up with Orthopaedic Hand, on an as needed basis for the basal joint arthritis  She may call [phone] to schedule an appointment if needed

## 2021-03-14 NOTE — DISCHARGE NOTE PROVIDER - NSDCFUADDAPPT_GEN_ALL_CORE_FT
You can follow up with Orthopaedic Hand, on an as needed basis for the basal joint arthritis  call [phone] to schedule an appointment if needed

## 2021-03-14 NOTE — DISCHARGE NOTE PROVIDER - NSDCCPCAREPLAN_GEN_ALL_CORE_FT
PRINCIPAL DISCHARGE DIAGNOSIS  Diagnosis: Subdural hemorrhage  Assessment and Plan of Treatment:        PRINCIPAL DISCHARGE DIAGNOSIS  Diagnosis: Subdural hemorrhage  Assessment and Plan of Treatment:       SECONDARY DISCHARGE DIAGNOSES  Diagnosis: Morbid obesity  Assessment and Plan of Treatment: BMI 42  -Counseled on diet.

## 2021-03-14 NOTE — DISCHARGE NOTE PROVIDER - NSDCCAREPROVSEEN_GEN_ALL_CORE_FT
Corinne Logan 72 y/o female, S/P Fall.  Small left SDH.  Neck sprain.  Right hand triquetral fracture.    PLAN:  - Neurosurgery f/u - recommended outpatient f/u  - Ortho f/u as outpatient   - pain control  - OOB  - PT  -  for discharge planning .     I was physically present for the key portions of the evaluation and management (E/M) service provided.  I agree with the above history, physical, and plan which I have reviewed and edited where appropriate.     35 minutes spent on total encounter; more than 50% of the visit was spent counseling and/or coordinating care by the attending physician.

## 2021-03-14 NOTE — DISCHARGE NOTE PROVIDER - CARE PROVIDER_API CALL
Juliette Engle)  [facility]  [address] [place] [address], [address]  [place] [address]  Phone: [phone]  Fax: [phone]  Follow Up Time: 2 weeks

## 2021-03-14 NOTE — PROGRESS NOTE ADULT - SUBJECTIVE AND OBJECTIVE BOX
Progress Note: General Surgery  Patient: JAYLON CEJA , 71y (1950)Female   MRN: [id number]  Location: [address]  Visit: 03-13-21 Inpatient  Date: 03-14-21 @ 04:20      Events/ 24h: Patient seen and examined at bedside.  Pain controlled. Afebrile, VSS.    Vitals: T(F): 97 (03-14-21 @ 01:00), Max: 98.3 (03-13-21 @ 10:40)  HR: 71 (03-14-21 @ 01:00)  BP: 114/55 (03-14-21 @ 01:00) (114/55 - 167/86)  RR: 18 (03-14-21 @ 01:00)  SpO2: 95% (03-14-21 @ 01:00)      Diet: Diet, NPO:   Except Medications     Special Instructions for Nursing:  Except Medications (03-13-21 @ 16:55)    IV Fluids: lactated ringers. 1000 milliLiter(s) (100 mL/Hr) IV Continuous <Continuous>      Physical Examination:  General Appearance: NAD   HEENT: EOMI, sclera anicteric.2cm lac to forehead covered with dermabond  Heart: RRR   Lungs: Symmetric chest wall expansion, equal rise and fall.  Abdomen:  Soft, nontender, nondistended.   MSK/Extremities: Warm & well-perfused.   Skin: Warm, dry. No jaundice.       Medications: [Standing]  acetaminophen   Tablet .. 650 milliGRAM(s) Oral every 6 hours  atorvastatin 10 milliGRAM(s) Oral at bedtime  chlorhexidine 4% Liquid 1 Application(s) Topical daily  hydrochlorothiazide 12.5 milliGRAM(s) Oral daily  lactated ringers. 1000 milliLiter(s) (100 mL/Hr) IV Continuous <Continuous>  levETIRAcetam 500 milliGRAM(s) Oral two times a day  losartan 10 milliGRAM(s) Oral daily  metoprolol tartrate 75 milliGRAM(s) Oral daily  pantoprazole    Tablet 40 milliGRAM(s) Oral before breakfast    DVT Prophylaxis:   GI Prophylaxis: pantoprazole    Tablet 40 milliGRAM(s) Oral before breakfast    Antibiotics:   Anticoagulation:   Medications:[PRN]      Labs:                        12.5   10.18 )-----------( 246      ( 13 Mar 2021 21:30 )             39.2     03-13    139  |  102  |  16  ----------------------------<  101<H>  4.0   |  26  |  0.6<L>    Ca    10.2<H>      13 Mar 2021 21:30  Phos  3.1     03-13  Mg     1.9     03-13    TPro  7.6  /  Alb  4.9  /  TBili  0.5  /  DBili  x   /  AST  32  /  ALT  22  /  AlkPhos  78  03-13    LIVER FUNCTIONS - ( 13 Mar 2021 12:00 )  Alb: 4.9 g/dL / Pro: 7.6 g/dL / ALK PHOS: 78 U/L / ALT: 22 U/L / AST: 32 U/L / GGT: x           PT/INR - ( 13 Mar 2021 12:00 )   PT: 11.30 sec;   INR: 0.98 ratio         PTT - ( 13 Mar 2021 12:00 )  PTT:31.9 sec          Imaging:   < from: CT Head No Cont (03.13.21 @ 11:31) >  IMPRESSION:  Focal subdural hemorrhage noted along the left anterior interhemispheric fissure with associated right frontal scalp soft tissue hematoma. No evidence of calvarial fracture.    These findings were discussed with Dr. Medina at 3/13/2021 11:46 AM by Dr. Mcmullen with read back confirmation.    < end of copied text >

## 2021-03-14 NOTE — DISCHARGE NOTE PROVIDER - NSFOLLOWUPCLINICS_GEN_ALL_ED_FT
[facility]  Trauma Surgery  [address] [place] [address]  Phone: [phone]  Fax:   Follow Up Time: 2 weeks

## 2021-03-14 NOTE — DISCHARGE NOTE PROVIDER - HOSPITAL COURSE
Patient is a 71F with PMH listed below who is a trauma transfer from [facility] s/p mechanical trip and fall over raised piece of pavement while walking her dog, +HT, -LOC, -AC. States that she landed on her bilateral hands and also hit her forehead. Patient reported initial headache which has now dulled and rates it as a 1/10. Denies dizziness, changes in vision, numbness or tingling in the extremities. States that she was able to ambulate after the fall without difficulty. Patient transferred to [place] due to CTH findings of an acute SDH without overlying skull fracture. Repeat CT of Head showed stable SDH, patient seen by neurosurgery, no surgical Intervention, recommended hold asa for 7 days and continue with keppra for 7 days. Patient with XR of Rt hand with possible triquetral fracture, XRay of RT knee s/p total knee replacement with no evidence of prosthetic loosening, Patient was examined by ORTHO no treatment needed, may follow as outpatient with orthopedic hand. Rest of trauma work up without acute injury   Currently patient stable, pain controlled, no neurologic deficit. Patient is stable for discharge and she is agreeable with the discharge plans     `< from: Xray Knee 1 or 2 Views, Right (03.14.21 @ 11:52) >    Status post right total knee replacement with no evidence of prosthetic loosening. No suprapatellar joint effusion. Mild prepatellar swelling. Vascular calcifications are minimal.    < end of copied text >       Assessment/Plan:   No acute neurosurgical intervention  Hold ASA for now  repeat CT head in 6 hrs or sooner if AMS  neuro checks q 4 hrs  Keppra for seizure ppx  admit to regular floor  above discussed w attg      Attending Attestation:   as above pt seen and examined. CTH stable. Neurointact. No neurosurgical intervention. Pt told to hold ASA containing products for 7 days (takes for pain0. F/u as outpt in 2 weeks. D/c per trauma team. Please reconsult as needed .    Img  R hand and wrist XR  Severe basal joint arthritis  No fracture of the carpal bones  Calcification in the dorsal wrist may represent old fracture or calcified joint capsule  No dislocations    A/P: Patient is a 71y year old Female with right hand superficial abrasions, thumb basal joint arthritis, no pain in the wrist to suggest triquetral avulsion fracture    No immobilization indicated  WBAT on RUE  Patient counseled on basal joint arthritis and the surgical treatments available for this condition  At this point she is not bothered by this joint and does not want to consider surgery  Continue activities as tolerated  She may follow up with Orthopaedic Hand, on an as needed basis for the basal joint arthritis  She may call [phone] to schedule an appointment if needed       Patient is a 71F with PMH listed below who is a trauma transfer from [facility] s/p mechanical trip and fall over raised piece of pavement while walking her dog, +HT, -LOC, -AC. States that she landed on her bilateral hands and also hit her forehead. Patient reported initial headache which has now dulled and rates it as a 1/10. Denies dizziness, changes in vision, numbness or tingling in the extremities. States that she was able to ambulate after the fall without difficulty. Patient transferred to [place] due to CTH findings of an acute SDH without overlying skull fracture. Repeat CT of Head showed stable SDH, patient seen by neurosurgery, no surgical Intervention, recommended hold asa for 7 days and continue with keppra for 7 days. Patient with XR of Rt hand with possible triquetral fracture, XRay of RT knee s/p total knee replacement with no evidence of prosthetic loosening, Patient was examined by ORTHO no treatment needed, may follow as outpatient with orthopedic hand. Rest of trauma work up without acute injury   Currently patient stable, pain controlled, no neurologic deficit. Patient is stable for discharge and she is agreeable with the discharge plans

## 2021-03-14 NOTE — PROGRESS NOTE ADULT - ASSESSMENT
Patient is a 71F who is a trauma transfer from [facility] s/p mechanical trip and fall over raised piece of pavement while walking her dog, +HT, -LOC, -AC.  Patient transferred to [place] due to CTH findings of an acute SDH without overlying skull fracture. Denies pain elsewhere or other symptoms at this time.      Plan:  - F/U read on repeat CTH  - Monitor vitals  - Monitor labs and replete as necessary  - Monitor for bowel function  - Continue Pain Medications if necessary  - Monitor urine output  - DVT and GI Prophylaxis  - Follow PT/Physiatry if necessary  - dispo planning when CTH report is back      Date/Time: 03-14-21 @ 04:20

## 2021-03-15 LAB
HCV AB S/CO SERPL IA: 0.04 COI — SIGNIFICANT CHANGE UP
HCV AB SERPL-IMP: SIGNIFICANT CHANGE UP

## 2021-03-16 PROBLEM — E78.00 PURE HYPERCHOLESTEROLEMIA, UNSPECIFIED: Chronic | Status: ACTIVE | Noted: 2021-03-13

## 2021-03-16 PROBLEM — Z00.00 ENCOUNTER FOR PREVENTIVE HEALTH EXAMINATION: Status: ACTIVE | Noted: 2021-03-16

## 2021-03-16 PROBLEM — I10 ESSENTIAL (PRIMARY) HYPERTENSION: Chronic | Status: ACTIVE | Noted: 2021-03-13

## 2021-03-24 NOTE — CDI QUERY NOTE - NSCDIOTHERTXTBX_GEN_ALL_CORE_HH
VIVIAN Sands    Patient's BMI was noted to be 42 on admission.  Please specify, if able, the  diagnosis associated with this finding.    -Morbid obesity  -Obesity  -Overweight  -Other (please specify)  -Unable to clinically determine    Thank you,  Hayley Begum  [facility] Department  [phone]

## 2021-03-25 ENCOUNTER — APPOINTMENT (OUTPATIENT)
Dept: SURGERY | Facility: CLINIC | Age: 71
End: 2021-03-25
Payer: MEDICARE

## 2021-03-25 VITALS
HEART RATE: 77 BPM | BODY MASS INDEX: 45.08 KG/M2 | DIASTOLIC BLOOD PRESSURE: 84 MMHG | WEIGHT: 245 LBS | SYSTOLIC BLOOD PRESSURE: 148 MMHG | HEIGHT: 62 IN | TEMPERATURE: 97.5 F

## 2021-03-25 PROCEDURE — 99213 OFFICE O/P EST LOW 20 MIN: CPT

## 2021-03-25 NOTE — ASSESSMENT
[FreeTextEntry1] : 71F here for f/u s/p fall on 3/13. Small SDH. Wrist and knee bruises. DOing very well. feels great . no pain. no dizziness, no HA. \par \par nad \par gcs15\par irizarry x 4 \par \par \par s/p fall , hospital admission , sdh\par - following up with neurosx monday \par - off keppra\par - doing well, follow up prn \par \par

## 2021-03-29 ENCOUNTER — APPOINTMENT (OUTPATIENT)
Dept: NEUROSURGERY | Facility: CLINIC | Age: 71
End: 2021-03-29
Payer: MEDICARE

## 2021-03-29 VITALS — HEIGHT: 63 IN | WEIGHT: 245 LBS | BODY MASS INDEX: 43.41 KG/M2

## 2021-03-29 DIAGNOSIS — Z78.9 OTHER SPECIFIED HEALTH STATUS: ICD-10-CM

## 2021-03-29 DIAGNOSIS — S06.5X9A TRAUMATIC SUBDURAL HEMORRHAGE WITH LOSS OF CONSCIOUSNESS OF UNSPECIFIED DURATION, INITIAL ENCOUNTER: ICD-10-CM

## 2021-03-29 DIAGNOSIS — E78.00 PURE HYPERCHOLESTEROLEMIA, UNSPECIFIED: ICD-10-CM

## 2021-03-29 DIAGNOSIS — Z87.39 PERSONAL HISTORY OF OTHER DISEASES OF THE MUSCULOSKELETAL SYSTEM AND CONNECTIVE TISSUE: ICD-10-CM

## 2021-03-29 DIAGNOSIS — Z87.891 PERSONAL HISTORY OF NICOTINE DEPENDENCE: ICD-10-CM

## 2021-03-29 DIAGNOSIS — I10 ESSENTIAL (PRIMARY) HYPERTENSION: ICD-10-CM

## 2021-03-29 PROCEDURE — 99212 OFFICE O/P EST SF 10 MIN: CPT

## 2021-03-29 RX ORDER — LEVETIRACETAM 250 MG/1
250 TABLET, FILM COATED ORAL
Refills: 0 | Status: ACTIVE | COMMUNITY

## 2021-03-29 RX ORDER — ACETAMINOPHEN 500 MG/1
TABLET ORAL
Refills: 0 | Status: ACTIVE | COMMUNITY

## 2021-03-29 RX ORDER — PITAVASTATIN CALCIUM 2.09 MG/1
2 TABLET, FILM COATED ORAL
Refills: 0 | Status: ACTIVE | COMMUNITY

## 2021-03-29 RX ORDER — CELECOXIB 50 MG/1
CAPSULE ORAL
Refills: 0 | Status: ACTIVE | COMMUNITY

## 2021-03-29 RX ORDER — METOPROLOL TARTRATE 25 MG/1
25 TABLET, FILM COATED ORAL
Refills: 0 | Status: ACTIVE | COMMUNITY

## 2021-03-29 RX ORDER — LOSARTAN POTASSIUM AND HYDROCHLOROTHIAZIDE 100; 12.5 MG/1; MG/1
TABLET, FILM COATED ORAL
Refills: 0 | Status: ACTIVE | COMMUNITY

## 2021-11-22 ENCOUNTER — OUTPATIENT (OUTPATIENT)
Dept: OUTPATIENT SERVICES | Facility: HOSPITAL | Age: 71
LOS: 1 days | Discharge: HOME | End: 2021-11-22
Payer: MEDICARE

## 2021-11-22 DIAGNOSIS — Z12.31 ENCOUNTER FOR SCREENING MAMMOGRAM FOR MALIGNANT NEOPLASM OF BREAST: ICD-10-CM

## 2021-11-22 PROCEDURE — 77063 BREAST TOMOSYNTHESIS BI: CPT | Mod: 26

## 2021-11-22 PROCEDURE — 77067 SCR MAMMO BI INCL CAD: CPT | Mod: 26

## 2023-01-06 ENCOUNTER — OUTPATIENT (OUTPATIENT)
Dept: OUTPATIENT SERVICES | Facility: HOSPITAL | Age: 73
LOS: 1 days | Discharge: HOME | End: 2023-01-06
Payer: MEDICARE

## 2023-01-06 DIAGNOSIS — Z12.31 ENCOUNTER FOR SCREENING MAMMOGRAM FOR MALIGNANT NEOPLASM OF BREAST: ICD-10-CM

## 2023-01-06 PROCEDURE — 77063 BREAST TOMOSYNTHESIS BI: CPT | Mod: 26

## 2023-01-06 PROCEDURE — 77067 SCR MAMMO BI INCL CAD: CPT | Mod: 26

## 2023-12-15 ENCOUNTER — OUTPATIENT (OUTPATIENT)
Dept: OUTPATIENT SERVICES | Facility: HOSPITAL | Age: 73
LOS: 1 days | Discharge: HOME | End: 2023-12-15
Payer: MEDICARE

## 2023-12-15 VITALS — DIASTOLIC BLOOD PRESSURE: 71 MMHG | SYSTOLIC BLOOD PRESSURE: 154 MMHG | HEART RATE: 67 BPM | RESPIRATION RATE: 17 BRPM

## 2023-12-15 VITALS
HEART RATE: 67 BPM | DIASTOLIC BLOOD PRESSURE: 82 MMHG | WEIGHT: 227.96 LBS | SYSTOLIC BLOOD PRESSURE: 171 MMHG | OXYGEN SATURATION: 98 % | HEIGHT: 63 IN | TEMPERATURE: 96 F | RESPIRATION RATE: 17 BRPM

## 2023-12-15 DIAGNOSIS — H25.11 AGE-RELATED NUCLEAR CATARACT, RIGHT EYE: ICD-10-CM

## 2023-12-15 DIAGNOSIS — Z98.890 OTHER SPECIFIED POSTPROCEDURAL STATES: Chronic | ICD-10-CM

## 2023-12-15 PROCEDURE — V2632: CPT

## 2023-12-15 RX ORDER — METOPROLOL TARTRATE 50 MG
0 TABLET ORAL
Qty: 0 | Refills: 0 | DISCHARGE

## 2023-12-15 RX ORDER — LOSARTAN/HYDROCHLOROTHIAZIDE 100MG-25MG
0 TABLET ORAL
Qty: 0 | Refills: 0 | DISCHARGE

## 2023-12-15 NOTE — ASU PATIENT PROFILE, ADULT - NSICDXPASTSURGICALHX_GEN_ALL_CORE_FT
PAST SURGICAL HISTORY:  History of surgery HYSTERECTOMY,  B/L KNEE REPLACEMENT, LEFT SHOULDER REPLACEMENT

## 2023-12-15 NOTE — PRE-ANESTHESIA EVALUATION ADULT - NSANTHOSAYNRD_GEN_A_CORE
No. SHAISTA screening performed.  STOP BANG Legend: 0-2 = LOW Risk; 3-4 = INTERMEDIATE Risk; 5-8 = HIGH Risk

## 2023-12-15 NOTE — ASU PATIENT PROFILE, ADULT - FALL HARM RISK - UNIVERSAL INTERVENTIONS
Bed in lowest position, wheels locked, appropriate side rails in place/Call bell, personal items and telephone in reach/Instruct patient to call for assistance before getting out of bed or chair/Non-slip footwear when patient is out of bed/[place] to call system/Physically safe environment - no spills, clutter or unnecessary equipment/Purposeful Proactive Rounding/Room/bathroom lighting operational, light cord in reach Bed in lowest position, wheels locked, appropriate side rails in place/Call bell, personal items and telephone in reach/Instruct patient to call for assistance before getting out of bed or chair/Non-slip footwear when patient is out of bed/[place] to call system/Physically safe environment - no spills, clutter or unnecessary equipment/Purposeful Proactive Rounding/Room/bathroom lighting operational, light cord in reach

## 2023-12-15 NOTE — ASU PATIENT PROFILE, ADULT - AS SC BRADEN MOBILITY
(3) slightly limited Merlin auto remote transmission alert for:    Atrial Noise reversion  24 VHR episodes  High V rates during AF  Exceeded AT/AF burden  <1%

## 2023-12-20 DIAGNOSIS — H25.11 AGE-RELATED NUCLEAR CATARACT, RIGHT EYE: ICD-10-CM

## 2023-12-29 ENCOUNTER — OUTPATIENT (OUTPATIENT)
Dept: OUTPATIENT SERVICES | Facility: HOSPITAL | Age: 73
LOS: 1 days | Discharge: HOME | End: 2023-12-29
Payer: MEDICARE

## 2023-12-29 VITALS
DIASTOLIC BLOOD PRESSURE: 81 MMHG | RESPIRATION RATE: 17 BRPM | SYSTOLIC BLOOD PRESSURE: 176 MMHG | TEMPERATURE: 96 F | WEIGHT: 229.94 LBS | OXYGEN SATURATION: 98 % | HEIGHT: 63 IN | HEART RATE: 68 BPM

## 2023-12-29 VITALS — DIASTOLIC BLOOD PRESSURE: 76 MMHG | HEART RATE: 63 BPM | RESPIRATION RATE: 18 BRPM | SYSTOLIC BLOOD PRESSURE: 150 MMHG

## 2023-12-29 DIAGNOSIS — H25.12 AGE-RELATED NUCLEAR CATARACT, LEFT EYE: ICD-10-CM

## 2023-12-29 DIAGNOSIS — H26.9 UNSPECIFIED CATARACT: Chronic | ICD-10-CM

## 2023-12-29 DIAGNOSIS — H26.8 OTHER SPECIFIED CATARACT: ICD-10-CM

## 2023-12-29 DIAGNOSIS — M19.90 UNSPECIFIED OSTEOARTHRITIS, UNSPECIFIED SITE: ICD-10-CM

## 2023-12-29 DIAGNOSIS — Z96.1 PRESENCE OF INTRAOCULAR LENS: ICD-10-CM

## 2023-12-29 DIAGNOSIS — Z98.41 CATARACT EXTRACTION STATUS, RIGHT EYE: ICD-10-CM

## 2023-12-29 DIAGNOSIS — E78.00 PURE HYPERCHOLESTEROLEMIA, UNSPECIFIED: ICD-10-CM

## 2023-12-29 DIAGNOSIS — I10 ESSENTIAL (PRIMARY) HYPERTENSION: ICD-10-CM

## 2023-12-29 DIAGNOSIS — Z98.890 OTHER SPECIFIED POSTPROCEDURAL STATES: Chronic | ICD-10-CM

## 2023-12-29 PROCEDURE — V2632: CPT

## 2023-12-29 RX ORDER — CELECOXIB 200 MG/1
0 CAPSULE ORAL
Qty: 0 | Refills: 0 | DISCHARGE

## 2023-12-29 RX ORDER — PITAVASTATIN CALCIUM 1.04 MG/1
0 TABLET, FILM COATED ORAL
Qty: 0 | Refills: 0 | DISCHARGE

## 2023-12-29 NOTE — ASU PATIENT PROFILE, ADULT - NSICDXPASTSURGICALHX_GEN_ALL_CORE_FT
PAST SURGICAL HISTORY:  History of surgery HYSTERECTOMY,  B/L KNEE REPLACEMENT, LEFT SHOULDER REPLACEMENT    Right cataract

## 2023-12-29 NOTE — ASU PATIENT PROFILE, ADULT - FALL HARM RISK - HARM RISK INTERVENTIONS
Assistance with ambulation/Assistance OOB with selected safe patient handling equipment/Communicate Risk of Fall with Harm to all staff/Discuss with provider need for PT consult/Monitor gait and stability/Provide patient with walking aids - walker, cane, crutches/Reinforce activity limits and safety measures with patient and family/Tailored Fall Risk Interventions/Visual Cue: Yellow wristband and red socks/Bed in lowest position, wheels locked, appropriate side rails in place/Call bell, personal items and telephone in reach/Instruct patient to call for assistance before getting out of bed or chair/Non-slip footwear when patient is out of bed/[place] to call system/Physically safe environment - no spills, clutter or unnecessary equipment/Purposeful Proactive Rounding/Room/bathroom lighting operational, light cord in reach Assistance with ambulation/Assistance OOB with selected safe patient handling equipment/Communicate Risk of Fall with Harm to all staff/Discuss with provider need for PT consult/Monitor gait and stability/Provide patient with walking aids - walker, cane, crutches/Reinforce activity limits and safety measures with patient and family/Tailored Fall Risk Interventions/Visual Cue: Yellow wristband and red socks/Bed in lowest position, wheels locked, appropriate side rails in place/Call bell, personal items and telephone in reach/Instruct patient to call for assistance before getting out of bed or chair/Non-slip footwear when patient is out of bed/[place] to call system/Physically safe environment - no spills, clutter or unnecessary equipment/Purposeful Proactive Rounding/Room/bathroom lighting operational, light cord in reach

## 2023-12-29 NOTE — ASU PATIENT PROFILE, ADULT - FALL HARM RISK - DEVICES
Problem: Nutrition  Goal: Optimal nutrition therapy  Outcome: Ongoing  Note:   Nutrition Problem: Inadequate oral intake  Intervention: Food and/or Nutrient Delivery: Continue current diet, Start ONS  Nutritional Goals: Pt will have po intakes 50% or greater this admission Walker

## 2024-09-11 PROBLEM — M48.00 SPINAL STENOSIS, SITE UNSPECIFIED: Chronic | Status: ACTIVE | Noted: 2023-12-29

## 2024-10-04 ENCOUNTER — OUTPATIENT (OUTPATIENT)
Dept: OUTPATIENT SERVICES | Facility: HOSPITAL | Age: 74
LOS: 1 days | End: 2024-10-04
Payer: MEDICARE

## 2024-10-04 ENCOUNTER — LABORATORY RESULT (OUTPATIENT)
Age: 74
End: 2024-10-04

## 2024-10-04 ENCOUNTER — APPOINTMENT (OUTPATIENT)
Age: 74
End: 2024-10-04

## 2024-10-04 VITALS
HEART RATE: 73 BPM | HEIGHT: 62.5 IN | WEIGHT: 233.5 LBS | BODY MASS INDEX: 41.89 KG/M2 | OXYGEN SATURATION: 98 % | SYSTOLIC BLOOD PRESSURE: 169 MMHG | TEMPERATURE: 98.1 F | DIASTOLIC BLOOD PRESSURE: 93 MMHG

## 2024-10-04 DIAGNOSIS — H26.9 UNSPECIFIED CATARACT: Chronic | ICD-10-CM

## 2024-10-04 DIAGNOSIS — Z98.890 OTHER SPECIFIED POSTPROCEDURAL STATES: Chronic | ICD-10-CM

## 2024-10-04 DIAGNOSIS — D72.829 ELEVATED WHITE BLOOD CELL COUNT, UNSPECIFIED: ICD-10-CM

## 2024-10-04 DIAGNOSIS — D72.819 DECREASED WHITE BLOOD CELL COUNT, UNSPECIFIED: ICD-10-CM

## 2024-10-04 LAB
ALBUMIN SERPL ELPH-MCNC: 4.7 G/DL
ALP BLD-CCNC: 88 U/L
ALT SERPL-CCNC: 13 U/L
ANION GAP SERPL CALC-SCNC: 13 MMOL/L
AST SERPL-CCNC: 17 U/L
BILIRUB SERPL-MCNC: 0.5 MG/DL
BUN SERPL-MCNC: 27 MG/DL
CALCIUM SERPL-MCNC: 10.2 MG/DL
CHLORIDE SERPL-SCNC: 104 MMOL/L
CO2 SERPL-SCNC: 23 MMOL/L
CREAT SERPL-MCNC: 0.6 MG/DL
CRP SERPL-MCNC: 8.4 MG/L
EGFR: 93 ML/MIN/1.73M2
GLUCOSE SERPL-MCNC: 103 MG/DL
HCT VFR BLD CALC: 40.9 %
HGB BLD-MCNC: 13.6 G/DL
LDH SERPL-CCNC: 207
MCHC RBC-ENTMCNC: 29.9 PG
MCHC RBC-ENTMCNC: 33.3 G/DL
MCV RBC AUTO: 89.9 FL
PLATELET # BLD AUTO: 282 K/UL
PMV BLD: 10.3 FL
POTASSIUM SERPL-SCNC: 4.5 MMOL/L
PROT SERPL-MCNC: 6.8 G/DL
RBC # BLD: 4.55 M/UL
RBC # FLD: 13 %
SODIUM SERPL-SCNC: 140 MMOL/L
WBC # FLD AUTO: 10.61 K/UL

## 2024-10-04 PROCEDURE — 81206 BCR/ABL1 GENE MAJOR BP: CPT

## 2024-10-04 PROCEDURE — 81207 BCR/ABL1 GENE MINOR BP: CPT

## 2024-10-04 PROCEDURE — 83970 ASSAY OF PARATHORMONE: CPT

## 2024-10-04 PROCEDURE — 83615 LACTATE (LD) (LDH) ENZYME: CPT

## 2024-10-04 PROCEDURE — 81270 JAK2 GENE: CPT

## 2024-10-04 PROCEDURE — 82310 ASSAY OF CALCIUM: CPT

## 2024-10-04 PROCEDURE — 85027 COMPLETE CBC AUTOMATED: CPT

## 2024-10-04 PROCEDURE — 80053 COMPREHEN METABOLIC PANEL: CPT

## 2024-10-04 PROCEDURE — G0452: CPT | Mod: 26

## 2024-10-04 PROCEDURE — 36415 COLL VENOUS BLD VENIPUNCTURE: CPT

## 2024-10-04 PROCEDURE — 99204 OFFICE O/P NEW MOD 45 MIN: CPT

## 2024-10-04 PROCEDURE — 86140 C-REACTIVE PROTEIN: CPT

## 2024-10-04 RX ORDER — GREEN TEA/HOODIA GORDONII 315-12.5MG
CAPSULE ORAL
Refills: 0 | Status: ACTIVE | COMMUNITY

## 2024-10-04 RX ORDER — LOSARTAN POTASSIUM AND HYDROCHLOROTHIAZIDE 25; 100 MG/1; MG/1
100-25 TABLET ORAL
Refills: 0 | Status: ACTIVE | COMMUNITY

## 2024-10-04 RX ORDER — METOPROLOL SUCCINATE 100 MG/1
100 TABLET, EXTENDED RELEASE ORAL
Refills: 0 | Status: ACTIVE | COMMUNITY

## 2024-10-04 RX ORDER — UBIDECARENONE 100 MG
100 CAPSULE ORAL
Refills: 0 | Status: ACTIVE | COMMUNITY

## 2024-10-04 RX ORDER — PITAVASTATIN CALCIUM 4 MG/1
4 TABLET ORAL
Refills: 0 | Status: ACTIVE | COMMUNITY

## 2024-10-04 RX ORDER — DOCUSATE SODIUM 100 MG/1
CAPSULE ORAL
Refills: 0 | Status: ACTIVE | COMMUNITY

## 2024-10-04 RX ORDER — PSYLLIUM HUSK 0.4 G
CAPSULE ORAL
Refills: 0 | Status: ACTIVE | COMMUNITY

## 2024-10-04 RX ORDER — IBUPROFEN 600 MG/1
600 TABLET, FILM COATED ORAL 3 TIMES DAILY
Refills: 0 | Status: ACTIVE | COMMUNITY

## 2024-10-04 NOTE — PHYSICAL EXAM
[Obese] : obese [Normal] : grossly intact [de-identified] : pleasant female in no acute distress [de-identified] : left knee with limited ROM

## 2024-10-04 NOTE — PHYSICAL EXAM
[Obese] : obese [Normal] : grossly intact [de-identified] : pleasant female in no acute distress [de-identified] : left knee with limited ROM

## 2024-10-04 NOTE — BEGINNING OF VISIT
[0] : 2) Feeling down, depressed, or hopeless: Not at all (0) [PHQ-2 Negative] : PHQ-2 Negative [Pain Scale: ___] : On a scale of 1-10, today the patient's pain is a(n) [unfilled]. [Former] : Former [> 15 Years] : > 15 Years [Reviewed, no changes] : Reviewed, no changes

## 2024-10-04 NOTE — HISTORY OF PRESENT ILLNESS
[de-identified] : 74 year old white female referred by Dr Kidd for leukocytosis . PMH of hypertension  , osteoarthritis s/p bilateral knee and left shoulder arthroplasty , spinal stenosis on epidural steroids ( last injection in July 2024 ) . Sh has baseline wbc of 10-11 , noted in August with wbc : 13.1 neutrophils : 10.375 mono: 1074 normal Hgb and platelets  Pertinent labs includes mild hypercalcemia ( 11.2 ) ferritin : 108 , normal thyroid function tests .  She is former smoker , denies history of diarrhea , inflammatory arthritis, or other chronic inflammatory / autoimmune disorders. no weight loss, fever , night sweats , FH : non - contributory  Health maintenance : last mammogram nearly 2 years ago , negative cologard

## 2024-10-04 NOTE — HISTORY OF PRESENT ILLNESS
[de-identified] : 74 year old white female referred by Dr Kidd for leukocytosis . PMH of hypertension  , osteoarthritis s/p bilateral knee and left shoulder arthroplasty , spinal stenosis on epidural steroids ( last injection in July 2024 ) . Sh has baseline wbc of 10-11 , noted in August with wbc : 13.1 neutrophils : 10.375 mono: 1074 normal Hgb and platelets  Pertinent labs includes mild hypercalcemia ( 11.2 ) ferritin : 108 , normal thyroid function tests .  She is former smoker , denies history of diarrhea , inflammatory arthritis, or other chronic inflammatory / autoimmune disorders. no weight loss, fever , night sweats , FH : non - contributory  Health maintenance : last mammogram nearly 2 years ago , negative cologard

## 2024-10-05 DIAGNOSIS — D72.819 DECREASED WHITE BLOOD CELL COUNT, UNSPECIFIED: ICD-10-CM

## 2024-10-05 LAB
CALCIUM SERPL-MCNC: 10.4 MG/DL
PARATHYROID HORMONE INTACT: 30 PG/ML

## 2024-10-10 LAB — T(9;22)(ABL1,BCR)/CONTROL BLD/T: NORMAL
